# Patient Record
Sex: MALE | Race: OTHER | HISPANIC OR LATINO | Employment: OTHER | URBAN - METROPOLITAN AREA
[De-identification: names, ages, dates, MRNs, and addresses within clinical notes are randomized per-mention and may not be internally consistent; named-entity substitution may affect disease eponyms.]

---

## 2018-05-30 ENCOUNTER — APPOINTMENT (OUTPATIENT)
Dept: GENERAL RADIOLOGY | Age: 28
End: 2018-05-30
Attending: EMERGENCY MEDICINE
Payer: SELF-PAY

## 2018-05-30 ENCOUNTER — HOSPITAL ENCOUNTER (EMERGENCY)
Age: 28
Discharge: HOME | End: 2018-05-30
Attending: EMERGENCY MEDICINE
Payer: SELF-PAY

## 2018-05-30 VITALS
DIASTOLIC BLOOD PRESSURE: 93 MMHG | TEMPERATURE: 97.7 F | SYSTOLIC BLOOD PRESSURE: 158 MMHG | RESPIRATION RATE: 16 BRPM | OXYGEN SATURATION: 98 % | HEART RATE: 83 BPM

## 2018-05-30 DIAGNOSIS — S39.012A BACK STRAIN, INITIAL ENCOUNTER: ICD-10-CM
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
## 2018-05-30 PROCEDURE — 99282 EMERGENCY DEPT VISIT SF MDM: CPT | Performed by: PHYSICIAN ASSISTANT

## 2018-05-30 PROCEDURE — 72100 X-RAY EXAM L-S SPINE 2/3 VWS: CPT

## 2018-05-30 RX ORDER — NAPROXEN 500 MG/1
500 TABLET ORAL 2 TIMES DAILY WITH MEALS
Qty: 20 TAB | Refills: 0 | Status: SHIPPED | OUTPATIENT
Start: 2018-05-30 | End: 2018-06-09

## 2018-05-30 RX ORDER — METHOCARBAMOL 750 MG/1
750 TABLET, FILM COATED ORAL 4 TIMES DAILY
Qty: 30 TAB | Refills: 0 | Status: SHIPPED | OUTPATIENT
Start: 2018-05-30 | End: 2018-06-07

## 2018-05-30 NOTE — ED TRIAGE NOTES
Pt arrives to the Er stating he fell at work around [employer], states his lower back hurts. Pt fell a week ago.  Ambulatory to triage

## 2018-05-30 NOTE — ED PROVIDER NOTES
HPI Comments: Pt states he fell aboutt 6 feet onto back 8 days ago, still with some back pain, no numbness or tingling, no bowel or bladder symptoms     Patient is a 29 y.o. male presenting with back pain. The history is provided by the patient. Back Pain    This is a new problem. The current episode started more than 1 week ago. The problem has not changed since onset. The problem occurs constantly. Patient reports work related injury. The pain is associated with a fall. The pain is present in the lumbar spine. The quality of the pain is described as aching. The pain does not radiate. The pain is at a severity of 5/10. The pain is mild. The symptoms are aggravated by certain positions. The pain is the same all the time. Pertinent negatives include no leg pain, no paresis, no tingling and no weakness. Treatments tried: asa. The treatment provided moderate relief. History reviewed. No pertinent past medical history. History reviewed. No pertinent surgical history. History reviewed. No pertinent family history. Social History     Social History    Marital status: SINGLE     Spouse name: N/A    Number of children: N/A    Years of education: N/A     Occupational History    Not on file. Social History Main Topics    Smoking status: Not on file    Smokeless tobacco: Not on file    Alcohol use Not on file    Drug use: Not on file    Sexual activity: Not on file     Other Topics Concern    Not on file     Social History Narrative    No narrative on file         ALLERGIES: Review of patient's allergies indicates no known allergies. Review of Systems   Musculoskeletal: Positive for back pain. Neurological: Negative for tingling and weakness. All other systems reviewed and are negative. Vitals:    05/30/18 1319   BP: (!) 158/93   Pulse: 83   Resp: 16   Temp: 97.7 °F (36.5 °C)   SpO2: 98%            Physical Exam   Constitutional: He is oriented to person, place, and time.  He appears well-developed and well-nourished. No distress. HENT:   Head: Normocephalic and atraumatic. Eyes: Conjunctivae and EOM are normal. Pupils are equal, round, and reactive to light. Neck: Normal range of motion. Neck supple. Cardiovascular: Normal rate and regular rhythm. Pulmonary/Chest: Effort normal and breath sounds normal. No respiratory distress. He has no wheezes. Abdominal: Soft. Bowel sounds are normal.   Musculoskeletal: He exhibits no edema. Back:    Mild pain to mid lower back, no pain into buttocks or legs no abrasions    Neurological: He is alert and oriented to person, place, and time. Skin: Skin is warm. Nursing note and vitals reviewed.        MDM  Number of Diagnoses or Management Options  Diagnosis management comments: l spine x rays negative  Will treat muscular pain and give note to return to work       Amount and/or Complexity of Data Reviewed  Tests in the radiology section of CPT®: ordered and reviewed  Review and summarize past medical records: yes    Risk of Complications, Morbidity, and/or Mortality  Presenting problems: low  Diagnostic procedures: low  Management options: low    Patient Progress  Patient progress: improved        ED Course       Procedures

## 2018-05-30 NOTE — DISCHARGE INSTRUCTIONS
Distensión de la espalda: Instrucciones de cuidado - [ Back Strain: Care Instructions ]  Instrucciones de cuidado    La distensión de la espalda ocurre cuando usted estira demasiado, o fuerza, un músculo de la espalda. Usted puede lesionarse la espalda en un accidente o cuando hace ejercicio o levanta algo. La mayoría de los niki de espalda mejoran con reposo y Singh. Usted puede cuidarse en el hogar para ayudar a que brewer espalda sane. La atención de seguimiento es avni parte clave de brewer tratamiento y seguridad. Asegúrese de hacer y acudir a todas las citas, y llame a brewer médico si está teniendo problemas. También es avni buena idea saber los resultados de los exámenes y mantener avni lista de los medicamentos que compa. ¿Cómo puede cuidarse en el hogar? · Trate de permanecer tan activo anay pueda, stephanie deje de hacer o reduzca cualquier actividad que le produzca dolor. · Colóquese hielo o avni compresa fría en el músculo adolorido de 10 a 20 minutos cada vez para detener la hinchazón. Trate de hacerlo cada 1 o 2 horas shoshana 3 días (cuando esté despierto) o hasta que la hinchazón baje. Póngase un paño corado entre el hielo y la piel. · Después de 2 o 3 días, coloque avni almohadilla térmica ajustada a baja temperatura o un paño tibio sobre la espalda. Algunos médicos sugieren que se alterne entre tratamientos calientes y fríos. · [employer] analgésicos (medicamentos para el dolor) exactamente según las indicaciones. ¨ Si el médico le recetó un analgésico, tómelo según las indicaciones. ¨ Si no está tomando un analgésico recetado, pregúntele a brewer médico si puede jayce darline de [employer]. · Trate de dormir de lado con avni almohada entre las piernas. O, colóquese avni almohada debajo de las rodillas cuando se acueste [place] Estas medidas pueden aliviar el dolor en la parte baja de la espalda. · Elihue Slates a poco a brewer nivel habitual de actividades. ¿Cuándo debe pedir ayuda?   Llame al 911 en cualquier momento que considere que necesita atención de [place]. Por ejemplo, llame si:  ? · Es completamente incapaz de  avni pierna. ?Llame a brewer médico ahora mismo o busque atención médica inmediata si:  ? · Tiene síntomas nuevos o peores en las piernas, el abdomen o las nalgas (glúteos). Los síntomas pueden incluir:  ¨ Entumecimiento u hormigueo. ¨ Debilidad. ¨ Dolor. ? · Pierde el control de la vejiga o del intestino. ?Preste especial atención a los cambios en brewer sherrell y asegúrese de comunicarse con brewer médico si no mejora anay se esperaba. ¿Dónde puede encontrar más información en inglés? [place] a http://srinivasa-jamie.info/. Keren Francisco L161 en la búsqueda para aprender más acerca de \"Distensión de la espalda: Instrucciones de cuidado - [ Back Strain: Care Instructions ]. \"  Revisado: 21 marzo, 2017  Versión del contenido: 11.4  © 8504-1751 Healthwise, Incorporated. Las instrucciones de cuidado fueron adaptadas bajo licencia por [employer] (which disclaims liability or warranty for this information). Si usted tiene [place] afección médica o sobre estas instrucciones, siempre pregunte a brewer profesional de sherrell. Healthwise, Incorporated niega toda garantía o responsabilidad por brewer uso de esta información.

## 2018-05-30 NOTE — ED NOTES
I have reviewed discharge instructions with the patient. The patient verbalized understanding. Patient left ED via Discharge Method: ambulatory to Home with family    Opportunity for questions and clarification provided. Patient given 2 scripts. To continue your aftercare when you leave the hospital, you may receive an automated call from our care team to check in on how you are doing. This is a free service and part of our promise to provide the best care and service to meet your aftercare needs.  If you have questions, or wish to unsubscribe from this service please call [phone]. Thank you for Choosing our [facility] Emergency Department.

## 2018-05-30 NOTE — LETTER
[facility] [facility]GENC[facility] DEPT One [address] [id number] [phone] Work/School Note Date: 5/30/2018 To Whom It May concern: 
 
Vaibhav Rolle was seen and treated today in the emergency room by the following provider(s): 
Attending Provider: Evaristo Romero MD 
Physician Assistant: EMANI Palacios. Vaibhav Rolle may return to work on 5-31-18. Sincerely, EMANI Palacios

## 2018-07-09 ENCOUNTER — HOSPITAL ENCOUNTER (EMERGENCY)
Age: 28
Discharge: HOME | End: 2018-07-09
Attending: EMERGENCY MEDICINE
Payer: SELF-PAY

## 2018-07-09 ENCOUNTER — APPOINTMENT (OUTPATIENT)
Dept: GENERAL RADIOLOGY | Age: 28
End: 2018-07-09
Attending: EMERGENCY MEDICINE
Payer: SELF-PAY

## 2018-07-09 VITALS
HEART RATE: 75 BPM | OXYGEN SATURATION: 100 % | TEMPERATURE: 98.1 F | WEIGHT: 150 LBS | RESPIRATION RATE: 18 BRPM | BODY MASS INDEX: 25.61 KG/M2 | DIASTOLIC BLOOD PRESSURE: 75 MMHG | HEIGHT: 64 IN | SYSTOLIC BLOOD PRESSURE: 137 MMHG

## 2018-07-09 DIAGNOSIS — S62.611B OPEN DISPLACED FRACTURE OF PROXIMAL PHALANX OF LEFT INDEX FINGER, INITIAL ENCOUNTER: Primary | ICD-10-CM

## 2018-07-09 PROCEDURE — 96365 THER/PROPH/DIAG IV INF INIT: CPT | Performed by: NURSE PRACTITIONER

## 2018-07-09 PROCEDURE — 99283 EMERGENCY DEPT VISIT LOW MDM: CPT | Performed by: NURSE PRACTITIONER

## 2018-07-09 PROCEDURE — 74011250636 HC RX REV CODE- 250/636: Performed by: NURSE PRACTITIONER

## 2018-07-09 PROCEDURE — 73130 X-RAY EXAM OF HAND: CPT

## 2018-07-09 PROCEDURE — 75810000053 HC SPLINT APPLICATION: Performed by: NURSE PRACTITIONER

## 2018-07-09 PROCEDURE — 77030008303 HC SPLNT FNGR ALUM CONC -A

## 2018-07-09 PROCEDURE — 99284 EMERGENCY DEPT VISIT MOD MDM: CPT | Performed by: NURSE PRACTITIONER

## 2018-07-09 PROCEDURE — 74011250637 HC RX REV CODE- 250/637: Performed by: NURSE PRACTITIONER

## 2018-07-09 PROCEDURE — 74011000258 HC RX REV CODE- 258: Performed by: NURSE PRACTITIONER

## 2018-07-09 RX ORDER — CEPHALEXIN 500 MG/1
500 CAPSULE ORAL 4 TIMES DAILY
Qty: 28 CAP | Refills: 0 | Status: SHIPPED | OUTPATIENT
Start: 2018-07-09 | End: 2018-07-16

## 2018-07-09 RX ORDER — HYDROCODONE BITARTRATE AND ACETAMINOPHEN 5; 325 MG/1; MG/1
1 TABLET ORAL
Qty: 20 TAB | Refills: 0 | Status: SHIPPED | OUTPATIENT
Start: 2018-07-09

## 2018-07-09 RX ORDER — ACETAMINOPHEN 325 MG/1
650 TABLET ORAL
Status: COMPLETED | OUTPATIENT
Start: 2018-07-09 | End: 2018-07-09

## 2018-07-09 RX ADMIN — CEFAZOLIN SODIUM 1 G: 1 INJECTION, POWDER, FOR SOLUTION INTRAMUSCULAR; INTRAVENOUS at 12:49

## 2018-07-09 RX ADMIN — ACETAMINOPHEN 650 MG: 325 TABLET ORAL at 12:37

## 2018-07-09 NOTE — ED NOTES
I have reviewed discharge instructions with the patient. The patient verbalized understanding. Patient left ED via Discharge Method: ambulatory to Home with self    Opportunity for questions and clarification provided. Patient given 2 scripts. To continue your aftercare when you leave the hospital, you may receive an automated call from our care team to check in on how you are doing. This is a free service and part of our promise to provide the best care and service to meet your aftercare needs.  If you have questions, or wish to unsubscribe from this service please call [phone]. Thank you for Choosing our [facility] Emergency Department.

## 2018-07-09 NOTE — LETTER
[address] [facility] EMERGENCY DEPT One [address] [id number] [phone] Work/School Note Date: 7/9/2018 To Whom It May concern: 
 
Ariana Schmitt was seen and treated today in the emergency room by the following provider(s): 
Attending Provider: Liliam Steve MD 
Nurse Practitioner: Elda Cevallos NP. Ariana Schmitt may return to work after evaluation from orthopedics. Sincerely, Elda Cevallos NP
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
## 2018-07-09 NOTE — DISCHARGE INSTRUCTIONS
Fractura de dedo: Instrucciones de cuidado - [ Finger Fracture: Care Instructions ]  Instrucciones de cuidado    Las fracturas de los huesos del dedo suelen sanar dave en cuestión de unas 3 a 4 semanas. El dolor y la hinchazón de un dedo fracturado pueden durar semanas. Dinora debería mejorar de forma tonya después de algunos días tras la fractura. Es muy importante que utilice y cuide el yeso o la tablilla (férula) exactamente anay brewer médico le indique para que brewer dedo sane de forma [place] y no termine torcido. Usar avni tablilla podría interferir con renetta actividades normales. Pida ayuda con renetta actividades diarias si lo necesita. Usted sanará mejor si cuida dave de sí mismo. Coma avni variedad de alimentos saludables y no fume. La atención de seguimiento es avni parte clave de brewer tratamiento y seguridad. Asegúrese de hacer y acudir a todas las citas, y llame a brewer médico si está teniendo problemas. También es avni buena idea saber los resultados de los exámenes y mantener avni lista de los medicamentos que compa. ¿Cómo puede cuidarse en el hogar? · Si el médico le puso avni tablilla en el dedo, úsela exactamente según las indicaciones. No se la quite [employer] indique que puede [place]. · Mantenga la mano elevada por encima del nivel de brewer corazón tanto anay pueda. [place] ayudará a reducir la hinchazón. · Colóquese hielo o avni compresa fría en el dedo shoshana 10 a 20 minutos cada vez. Trate de hacerlo cada 1 a 2 horas shoshana los siguientes 3 días (cuando esté despierto) o hasta que la hinchazón baje. Póngase un paño corado entre el hielo y la piel. Mantenga la tablilla seca. · Sea merlin con los medicamentos. [place] los analgésicos (medicamentos para el dolor) exactamente según las indicaciones. ¨ Si el médico le recetó un analgésico, tómelo según las indicaciones. ¨ Si no está tomando un analgésico recetado, pregúntele a brewer médico si puede jayce darline de [employer].   ¿Cuándo debe pedir ayuda? Llame al 911 en cualquier momento que considere que necesita atención de [place]. Por ejemplo, llame si:  ? · El dedo está frío o pálido o cambia de color. ?Llame a brewer médico ahora mismo o busque atención médica inmediata si:  ? · Brewer dolor empeora mucho.   ? · Tiene hormigueo, debilitamiento o entumecimiento en el dedo. ? · Tiene señales de infección, tales anay:  ¨ Aumento del dolor, la hinchazón, el enrojecimiento o la temperatura. ¨ Vetas rojizas que salen de la katie. ¨ Pus que supura de la katie. ¨ Ganglios linfáticos inflamados en el dusty, las axilas o la brayan. Cheral Irving. ?Preste especial atención a los cambios en brewer sherrell y asegúrese de comunicarse con brewer médico si:  ? · El dedo no mejora de [place]. ¿Dónde puede encontrar más información en inglés? Avila barnes http://[email or web address]-jamie.info/. Tereza Regino C026 en la búsqueda para aprender más acerca de \"Fractura de dedo: Instrucciones de cuidado - [ Finger Fracture: Care Instructions ]. \"  Revisado: 21 marzo, 2017  Versión del contenido: 11.4  © 1963-2693 Healthwise, Incorporated. Las instrucciones de cuidado fueron adaptadas bajo licencia por [employer] (which disclaims liability or warranty for this information). Si usted tiene [place] afección médica o sobre estas instrucciones, siempre pregunte a brewer profesional de sherrell. Healthwise, Incorporated niega toda garantía o responsabilidad por brewer uso de esta información.

## 2018-07-09 NOTE — Clinical Note
Home with family. Keep dressing in place until evaluated by orthopedics. They will be calling you for an appointment in the next 2 days. Take antibiotics as ordered. Take pain medication as needed keeping in mind that narcotics can be addictive and s hould only be taken when absolutely necessary. Return to the emergency department for any sign of infection such as red streaks running up her arm, purulent drainage from your wound site, fever or chills.   Work note